# Patient Record
Sex: MALE | Race: WHITE | NOT HISPANIC OR LATINO | Employment: PART TIME | URBAN - METROPOLITAN AREA
[De-identification: names, ages, dates, MRNs, and addresses within clinical notes are randomized per-mention and may not be internally consistent; named-entity substitution may affect disease eponyms.]

---

## 2022-09-30 ENCOUNTER — APPOINTMENT (EMERGENCY)
Dept: CT IMAGING | Facility: HOSPITAL | Age: 22
End: 2022-09-30

## 2022-09-30 ENCOUNTER — HOSPITAL ENCOUNTER (EMERGENCY)
Facility: HOSPITAL | Age: 22
Discharge: HOME | End: 2022-10-01
Attending: EMERGENCY MEDICINE

## 2022-09-30 DIAGNOSIS — S02.2XXA CLOSED FRACTURE OF NASAL BONE, INITIAL ENCOUNTER: ICD-10-CM
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
## 2022-09-30 DIAGNOSIS — S00.83XA CONTUSION OF FACE, INITIAL ENCOUNTER: ICD-10-CM

## 2022-09-30 DIAGNOSIS — S01.111A EYEBROW LACERATION, RIGHT, INITIAL ENCOUNTER: Primary | ICD-10-CM

## 2022-09-30 PROCEDURE — 70486 CT MAXILLOFACIAL W/O DYE: CPT

## 2022-09-30 PROCEDURE — 12013 RPR F/E/E/N/L/M 2.6-5.0 CM: CPT | Performed by: EMERGENCY MEDICINE

## 2022-09-30 PROCEDURE — G1004 CDSM NDSC: HCPCS

## 2022-09-30 PROCEDURE — 99283 EMERGENCY DEPT VISIT LOW MDM: CPT

## 2022-09-30 PROCEDURE — 99282 EMERGENCY DEPT VISIT SF MDM: CPT | Performed by: EMERGENCY MEDICINE

## 2022-09-30 RX ORDER — LIDOCAINE HYDROCHLORIDE AND EPINEPHRINE 10; 10 MG/ML; UG/ML
1 INJECTION, SOLUTION INFILTRATION; PERINEURAL ONCE
Status: COMPLETED | OUTPATIENT
Start: 2022-09-30 | End: 2022-09-30

## 2022-09-30 RX ORDER — ACETAMINOPHEN 325 MG/1
975 TABLET ORAL ONCE
Status: COMPLETED | OUTPATIENT
Start: 2022-09-30 | End: 2022-09-30

## 2022-09-30 RX ADMIN — LIDOCAINE HYDROCHLORIDE,EPINEPHRINE BITARTRATE 1 ML: 10; .01 INJECTION, SOLUTION INFILTRATION; PERINEURAL at 23:35

## 2022-09-30 RX ADMIN — ACETAMINOPHEN 975 MG: 325 TABLET, FILM COATED ORAL at 23:36
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
## 2022-10-01 VITALS
SYSTOLIC BLOOD PRESSURE: 128 MMHG | BODY MASS INDEX: 20.73 KG/M2 | HEIGHT: 69 IN | HEART RATE: 92 BPM | TEMPERATURE: 98.7 F | RESPIRATION RATE: 14 BRPM | OXYGEN SATURATION: 98 % | DIASTOLIC BLOOD PRESSURE: 69 MMHG | WEIGHT: 140 LBS

## 2022-10-01 RX ORDER — GINSENG 100 MG
1 CAPSULE ORAL ONCE
Status: COMPLETED | OUTPATIENT
Start: 2022-10-01 | End: 2022-10-01

## 2022-10-01 RX ADMIN — BACITRACIN 1 SMALL APPLICATION: 500 OINTMENT TOPICAL at 00:35
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
## 2022-10-01 NOTE — DISCHARGE INSTRUCTIONS
Sleep with head and shoulders elevated on several pillows or in a recliner tonight and tomorrow to minimize facial swelling  Gently clean facial wounds  Apply antibiotic ointment to the right eyebrow sutures once a day for 3 more days  Ice the injuries for 20 minutes every few hours as needed for pain or swelling  Sutures will dissolve on their own  Take Tylenol and/or ibuprofen as needed for pain  Follow-up with the ENT or the plastic surgeon listed below within the next week

## 2022-10-07 ENCOUNTER — CONSULT (OUTPATIENT)
Dept: PLASTIC SURGERY | Facility: CLINIC | Age: 22
End: 2022-10-07

## 2022-10-07 VITALS
HEART RATE: 77 BPM | WEIGHT: 140 LBS | TEMPERATURE: 98.2 F | DIASTOLIC BLOOD PRESSURE: 65 MMHG | SYSTOLIC BLOOD PRESSURE: 99 MMHG | HEIGHT: 69 IN | BODY MASS INDEX: 20.73 KG/M2

## 2022-10-07 DIAGNOSIS — S02.2XXA CLOSED FRACTURE OF NASAL BONE, INITIAL ENCOUNTER: Primary | ICD-10-CM

## 2022-10-07 RX ORDER — MULTIVITAMIN
1 TABLET ORAL DAILY
COMMUNITY
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
